# Patient Record
Sex: FEMALE | Employment: PART TIME | URBAN - METROPOLITAN AREA
[De-identification: names, ages, dates, MRNs, and addresses within clinical notes are randomized per-mention and may not be internally consistent; named-entity substitution may affect disease eponyms.]

---

## 2024-10-15 ENCOUNTER — TELEPHONE (OUTPATIENT)
Age: 24
End: 2024-10-15

## 2024-10-15 ENCOUNTER — OFFICE VISIT (OUTPATIENT)
Dept: FAMILY MEDICINE CLINIC | Facility: CLINIC | Age: 24
End: 2024-10-15
Payer: COMMERCIAL

## 2024-10-15 VITALS
TEMPERATURE: 98.1 F | RESPIRATION RATE: 16 BRPM | SYSTOLIC BLOOD PRESSURE: 110 MMHG | DIASTOLIC BLOOD PRESSURE: 78 MMHG | HEART RATE: 78 BPM

## 2024-10-15 DIAGNOSIS — R30.0 DYSURIA: ICD-10-CM

## 2024-10-15 DIAGNOSIS — M54.50 LUMBAR BACK PAIN: Primary | ICD-10-CM

## 2024-10-15 DIAGNOSIS — Z76.89 ENCOUNTER TO ESTABLISH CARE: ICD-10-CM

## 2024-10-15 LAB
SL AMB  POCT GLUCOSE, UA: NORMAL
SL AMB LEUKOCYTE ESTERASE,UA: NORMAL
SL AMB POCT BILIRUBIN,UA: NORMAL
SL AMB POCT BLOOD,UA: NORMAL
SL AMB POCT CLARITY,UA: NORMAL
SL AMB POCT COLOR,UA: CLEAR
SL AMB POCT KETONES,UA: NORMAL
SL AMB POCT NITRITE,UA: NORMAL
SL AMB POCT PH,UA: 6
SL AMB POCT SPECIFIC GRAVITY,UA: 1.03
SL AMB POCT URINE PROTEIN: NORMAL
SL AMB POCT UROBILINOGEN: NORMAL

## 2024-10-15 PROCEDURE — 99203 OFFICE O/P NEW LOW 30 MIN: CPT

## 2024-10-15 PROCEDURE — 81002 URINALYSIS NONAUTO W/O SCOPE: CPT

## 2024-10-15 RX ORDER — MELOXICAM 15 MG/1
15 TABLET ORAL DAILY
Qty: 14 TABLET | Refills: 0 | Status: SHIPPED | OUTPATIENT
Start: 2024-10-15

## 2024-10-15 RX ORDER — SENNOSIDES 8.6 MG
650 CAPSULE ORAL EVERY 8 HOURS PRN
Qty: 30 TABLET | Refills: 0 | Status: SHIPPED | OUTPATIENT
Start: 2024-10-15

## 2024-10-15 NOTE — PROGRESS NOTES
Ambulatory Visit  Name: Amira Lee      : 2000      MRN: [id number]  Encounter Provider: Buck Clark DO  Encounter Date: 10/15/2024   Encounter department: [facility]    Assessment & Plan  Lumbar back pain  Patient present with new onset low back pain without acute injury.  Negative straight leg raise bilaterally.  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  Patient's vital signs within normal limits.  Patient did also complain of suprapubic tenderness and dysuria but no signs of acute UTI with normal point-of-care urinalysis.    5 out of 5 muscle strength bilateral lower extremities.  Complete sensation intact bilateral lower extremities.  No bowel or bladder incontinence or saddle anesthesias. (Patient encouraged to go to ed if she develops any of these symptoms)    We will trial OMT therapy scheduled for this week (prior auth required), Mobic once daily for 2 weeks, Tylenol as needed for breakthrough pain as well as Voltaren gel.  Patient also urged to stay active without heavy lifting and to use ice and heat on a daily basis.    We will follow-up for annual physical within 1 month for healthcare maintenance.  Patient does not complain of abnormal uterine bleeding or menorrhagia or sexual activity.  Although patient does endorse painful periods.  We can investigate further and complete cervical cancer screening at annual physical especially if back pain does not resolved with conservative management.  Orders:    meloxicam (Mobic) 15 mg tablet; Take 1 tablet (15 mg total) by mouth daily    Diclofenac Sodium (VOLTAREN) 1 %; Apply 2 g topically 4 (four) times a day    acetaminophen (TYLENOL) 650 mg CR tablet; Take 1 tablet (650 mg total) by mouth every 8 (eight) hours as needed for mild pain    POCT urine dip    Encounter to establish care         Dysuria  Mild dysuria and suprapubic tenderness. With  normal UA & no CV tenderness nor fevers. Will treat back pain and continue to monitor for symptoms. Patient also seemed to have difficulty with  on cyra com with describing symptoms.   Orders:    POCT urine dip       History of Present Illness     Patient presents to the office to establish care.  She does not endorse any chronic medical conditions or does not take any medication regularly.  She decided establish care due to new onset back pain which occurred approximately 10 days ago.  Patient cannot pinpoint 1 specific injury or event in which this back pain began.  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  She has to bend twist and lift up many boxes that are very heavy.  Patient denies any history of back injuries in the past.  Patient states she has never had pain this severe in the past.  Although she has had aches and pains that self resolved.  Patient denies any bowel or bladder incontinence or weakness.  Although she does endorse suprapubic tenderness and dysuria.    She endorses regular menstrual cycles with no recent sexual activity within the last year.  Her most recent period was on 10/7/2024.    Back pain characteristics as listed below.    Back Pain  This is a new problem. The current episode started 1 to 4 weeks ago. The problem occurs constantly. The problem is unchanged. The pain is present in the lumbar spine. The quality of the pain is described as aching, burning and stabbing. The pain does not radiate. The pain is at a severity of 6/10. The symptoms are aggravated by twisting and standing (extension). Stiffness is present All day. Associated symptoms include abdominal pain, dysuria, leg pain and tingling. Pertinent negatives include no bladder incontinence, bowel incontinence, fever, headaches, numbness, paresis, paresthesias, pelvic pain, perianal numbness, weakness or weight loss. Risk factors include poor posture. She has tried  nothing for the symptoms.       History obtained from : patient w/ cyra com interpretation   Review of Systems   Constitutional:  Negative for fever and weight loss.   Gastrointestinal:  Positive for abdominal pain. Negative for bowel incontinence.   Genitourinary:  Positive for dysuria. Negative for bladder incontinence and pelvic pain.   Musculoskeletal:  Positive for back pain.   Neurological:  Positive for tingling. Negative for weakness, numbness, headaches and paresthesias.     Pertinent Medical History         Medical History Reviewed by provider this encounter:  Meds  Problems  Med Hx  Surg Hx  Fam Hx       Past Medical History   History reviewed. No pertinent past medical history.  History reviewed. No pertinent surgical history.  History reviewed. No pertinent family history.  No current outpatient medications on file prior to visit.     No current facility-administered medications on file prior to visit.   Not on File   No current outpatient medications on file prior to visit.     No current facility-administered medications on file prior to visit.      Social History     Tobacco Use    Smoking status: Not on file    Smokeless tobacco: Not on file   Substance and Sexual Activity    Alcohol use: Not on file    Drug use: Not on file    Sexual activity: Not on file         Objective     /78 (BP Location: Left arm)   Pulse 78   Temp 98.1 °F (36.7 °C) (Temporal)   Resp 16   LMP 10/07/2024 (Approximate)     Physical Exam  Constitutional:       General: She is not in acute distress.     Appearance: She is not ill-appearing or toxic-appearing.   HENT:      Head: Normocephalic and atraumatic.      Right Ear: External ear normal.      Left Ear: External ear normal.      Nose: No congestion or rhinorrhea.      Mouth/Throat:      Pharynx: No oropharyngeal exudate or posterior oropharyngeal erythema.   Eyes:      Conjunctiva/sclera: Conjunctivae normal.   Cardiovascular:      Rate and Rhythm: Normal  rate and regular rhythm.      Pulses: Normal pulses.      Heart sounds: Normal heart sounds. No murmur heard.  Pulmonary:      Effort: Pulmonary effort is normal. No respiratory distress.      Breath sounds: Normal breath sounds. No wheezing or rales.   Abdominal:      General: Bowel sounds are normal. There is no distension.      Palpations: Abdomen is soft.      Tenderness: There is no abdominal tenderness (mild suprapubic tenderness). There is no right CVA tenderness, left CVA tenderness or guarding.   Musculoskeletal:         General: Tenderness (lumbar paraspinal musculature & PSIS on right) present. No swelling. Normal range of motion.      Cervical back: Normal.      Thoracic back: Normal.      Lumbar back: Spasms and tenderness present. No swelling, edema, deformity, signs of trauma, lacerations or bony tenderness. Normal range of motion. Negative right straight leg raise test and negative left straight leg raise test. No scoliosis.      Right lower leg: No edema.      Left lower leg: No edema.      Comments: L1 to L5 NsLr R     No step offs noted     5/5 muscle strength bilateral lower extremities  Plus 2 out of 4 reflexes of patellar and Achilles bilaterally  Sensation intact bilateral lower extremities   Skin:     General: Skin is warm.      Capillary Refill: Capillary refill takes less than 2 seconds.   Neurological:      General: No focal deficit present.      Mental Status: She is alert and oriented to person, place, and time. Mental status is at baseline.   Psychiatric:         Mood and Affect: Mood normal.         Behavior: Behavior normal.       Buck Clark, DO  PGY-2 Family Medicine

## 2024-10-15 NOTE — TELEPHONE ENCOUNTER
PA for Diclofenac Sodium (VOLTAREN) 1 % SUBMITTED     via    []CMM-KEY:    [x]Surescripts-Case ID # [id number]  []Availity-Auth ID #  NDC #     []Faxed to plan   []Other website    []Phone call Case ID #      Office notes sent, clinical questions answered. Awaiting determination    Turnaround time for your insurance to make a decision on your Prior Authorization can take 7-21 business days.

## 2024-10-17 ENCOUNTER — PROCEDURE VISIT (OUTPATIENT)
Dept: FAMILY MEDICINE CLINIC | Facility: CLINIC | Age: 24
End: 2024-10-17
Payer: COMMERCIAL

## 2024-10-17 DIAGNOSIS — M99.02 SOMATIC DYSFUNCTION OF THORACIC REGION: ICD-10-CM

## 2024-10-17 DIAGNOSIS — M99.04 SOMATIC DYSFUNCTION OF SACRAL REGION: ICD-10-CM

## 2024-10-17 DIAGNOSIS — M54.50 ACUTE RIGHT-SIDED LOW BACK PAIN WITHOUT SCIATICA: Primary | ICD-10-CM

## 2024-10-17 DIAGNOSIS — M99.03 SOMATIC DYSFUNCTION OF LUMBAR REGION: ICD-10-CM

## 2024-10-17 DIAGNOSIS — M99.05 SOMATIC DYSFUNCTION OF PELVIS REGION: ICD-10-CM

## 2024-10-17 PROCEDURE — 99213 OFFICE O/P EST LOW 20 MIN: CPT

## 2024-10-17 NOTE — TELEPHONE ENCOUNTER
PA for Diclofenac Sodium (VOLTAREN) 1 % DENIED    Reason:(Screenshot if applicable)        Message sent to office clinical pool Yes    Denial letter scanned into Media Yes    Appeal started No (Provider will need to decide if appeal is warranted and send clinical documentation to Prior Authorization Team for initiation.)    **Please follow up with your patient regarding denial and next steps**

## 2024-10-18 NOTE — PROGRESS NOTES
The Assessment & Plan     This is a 24 y.o. female who presents for OMT follow-up for:  1. Acute right-sided low back pain without sciatica        2. Somatic dysfunction of thoracic region        3. Somatic dysfunction of lumbar region        4. Somatic dysfunction of sacral region        5. Somatic dysfunction of pelvis region             1. Patient tolerated OMT well for the above problems,  advised patient to drink fluids and can use NSAID for soreness after treatment     2. OMT Follow up in 2 weeks.    Subjective     CyraCom Hebrew interruption used:     Amira Lee is a 24 y.o. female and is here for a OMT follow up. The patient reports new onset back pain which occurred approximately 12 days ago. Patient cannot pinpoint 1 specific injury or event in which this back pain began. 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 Patient denies any history of back injuries in the past. Patient states she has never had pain this severe in the past. Although she has had aches and pains that self resolved. Patient denies any bowel or bladder incontinence or weakness.     Patient has noted improvement with Mobic daily.  Although she was not able to get Tylenol and Voltaren gel.  Due to insurance approval.  Patient was educated that these are over-the-counter medications and most likely insurance will not approve.  Patient showed for pictures of these medications so she can  at her local pharmacy.  Patient expresses understanding.    Is the patient taking Pain medication? no  Has the patient completed physical therapy for this condition? no  Did Patient symptoms improve from last OMT appointment?  This is her first appointment.     The following portions of the patient's history were reviewed and updated as appropriate: allergies, current medications, past family history, past medical history, past  social history, past surgical history, and problem list.    Review of Systems  Review of Systems   Musculoskeletal:  Positive for back pain. Negative for gait problem.   Neurological:  Negative for weakness and headaches.         Objective     OMT Exam     OMT    Performed by: Buck Clark DO  Authorized by: Buck Clark DO  Universal Protocol:  procedure performed by consultantConsent: Verbal consent obtained.  Consent given by: patient  Patient understanding: patient states understanding of the procedure being performed  Patient identity confirmed: verbally with patient      Procedure Details:     Region evaluated and treated:  Pelvis Innominate, Lumbar, Sacrum/Pelvis and Thoracic    Thoracic Information  Thoracic Region: T10 - T12  Thoracic T10 - T12 details:     Examination Method:  Tissue Texture Change, Stability, Laxity, Effusions, Tone and Asymmetry, Misalignment, Crepitation, Defects, Masses    Severity:  Moderate    Osteopathic Findings:  Quadratus lumborum spasm on patient's right.  Paraspinal hypertonicity.  T10-T12 neutral side bent left rotated right    Treatment Method:  Soft Tissue Treatment, Myofascial Release Treatment, Muscle Energy Treatment, High Velocity, Low Amplitude Treatment, Facilitated Positional Release Treatment and Counterstrain Treatment    Response:  Improved - The somatic dysfunction is improved but not completely resolved.    Lumbar details:     Examination Method:  Asymmetry, Misalignment, Crepitation, Defects, Masses and Tissue Texture Change, Stability, Laxity, Effusions, Tone    Severity:  Moderate    Osteopathic Findings:  Paraspinal hypertonicity very prominent on patient's right.  L1 L5 neutral side bent left rotated right.  L3 flexed side bent right rotated right.    *Patient had pressure and discomfort due to muscle tension and stretching of muscle energy so treatment was discontinued at patient's request*     Treatment Method:  Soft Tissue Treatment, Myofascial  Release Treatment, Muscle Energy Treatment and Facilitated Positional Release Treatment    Response:  Improved - The somatic dysfunction is improved but not completely resolved.    Sacrum/Pelvis details:     Examination Method:  Tissue Texture Change, Stability, Laxity, Effusions, Tone and Asymmetry, Misalignment, Crepitation, Defects, Masses    Severity:  Moderate    Osteopathic Findings:  Positive back pending.  Sacral stuck in extension.    Treatment Method:  Articulatory Treatment    Response:  Improved - The somatic dysfunction is improved but not completely resolved.    Pelvis Innominate details:     Examination Method:  Tissue Texture Change, Stability, Laxity, Effusions, Tone and Asymmetry, Misalignment, Crepitation, Defects, Masses    Severity:  Moderate    Osteopathic Findings:  Anterior innominate rotation    Treatment Method:  Muscle Energy Treatment and Facilitated Positional Release Treatment    Response:  Improved - The somatic dysfunction is improved but not completely resolved.    Total Regions Treated:  4  Attending provider present in exam room for procedure: Effie Clark, DO  PGY-2 Family Medicine

## 2024-11-22 ENCOUNTER — TELEPHONE (OUTPATIENT)
Age: 24
End: 2024-11-22

## 2024-11-22 ENCOUNTER — PROCEDURE VISIT (OUTPATIENT)
Dept: FAMILY MEDICINE CLINIC | Facility: CLINIC | Age: 24
End: 2024-11-22
Payer: COMMERCIAL

## 2024-11-22 DIAGNOSIS — Z11.59 ENCOUNTER FOR HEPATITIS C SCREENING TEST FOR LOW RISK PATIENT: ICD-10-CM

## 2024-11-22 DIAGNOSIS — F41.1 GENERALIZED ANXIETY DISORDER: ICD-10-CM

## 2024-11-22 DIAGNOSIS — Z28.21 IMMUNIZATION DECLINED: ICD-10-CM

## 2024-11-22 DIAGNOSIS — Z00.00 ANNUAL PHYSICAL EXAM: Primary | ICD-10-CM

## 2024-11-22 DIAGNOSIS — Z13.1 SCREENING FOR DIABETES MELLITUS: ICD-10-CM

## 2024-11-22 DIAGNOSIS — Z13.220 SCREENING, LIPID: ICD-10-CM

## 2024-11-22 DIAGNOSIS — R53.83 OTHER FATIGUE: ICD-10-CM

## 2024-11-22 DIAGNOSIS — Z11.4 ENCOUNTER FOR SCREENING FOR HIV: ICD-10-CM

## 2024-11-22 DIAGNOSIS — M54.50 ACUTE LOW BACK PAIN WITHOUT SCIATICA, UNSPECIFIED BACK PAIN LATERALITY: ICD-10-CM

## 2024-11-22 PROCEDURE — 99213 OFFICE O/P EST LOW 20 MIN: CPT

## 2024-11-22 PROCEDURE — 99395 PREV VISIT EST AGE 18-39: CPT

## 2024-11-22 RX ORDER — SERTRALINE HYDROCHLORIDE 25 MG/1
25 TABLET, FILM COATED ORAL DAILY
Qty: 30 TABLET | Refills: 5 | Status: SHIPPED | OUTPATIENT
Start: 2024-11-22 | End: 2025-05-21

## 2024-11-22 NOTE — TELEPHONE ENCOUNTER
Contacted patient in regards to Routine Referral in attempts to verify patient's needs of services and add patient to proper wait list. spoke with patient whom stated she is interested in talk therapy and when telling patient she can be added to the wait list, patient proceeded to say she was at work and will call back. Closing referral.

## 2024-11-22 NOTE — PATIENT INSTRUCTIONS
"Patient Education     Routine physical for adults   The Basics   Written by the doctors and editors at [facility]   What is a physical? -- A physical is a routine visit, or \"check-up,\" with your doctor. You might also hear it called a \"wellness visit\" or \"preventive visit.\"  During each visit, the doctor will:   Ask about your physical and mental health   Ask about your habits, behaviors, and lifestyle   Do an exam   Give you vaccines if needed   Talk to you about any medicines you take   Give advice about your health   Answer your questions  Getting regular check-ups is an important part of taking care of your health. It can help your doctor find and treat any problems you have. But it's also important for preventing health problems.  A routine physical is different from a \"sick visit.\" A sick visit is when you see a doctor because of a health concern or problem. Since physicals are scheduled ahead of time, you can think about what you want to ask the doctor.  How often should I get a physical? -- It depends on your age and health. In general, for people age 21 years and older:   If you are younger than 50 years, you might be able to get a physical every 3 years.   If you are 50 years or older, your doctor might recommend a physical every year.  If you have an ongoing health condition, like diabetes or high blood pressure, your doctor will probably want to see you more often.  What happens during a physical? -- In general, each visit will include:   Physical exam - The doctor or nurse will check your height, weight, heart rate, and blood pressure. They will also look at your eyes and ears. They will ask about how you are feeling and whether you have any symptoms that bother you.   Medicines - It's a good idea to bring a list of all the medicines you take to each doctor visit. Your doctor will talk to you about your medicines and answer any questions. Tell them if you are having any side effects that bother you. You " "should also tell them if you are having trouble paying for any of your medicines.   Habits and behaviors - This includes:   Your diet   Your exercise habits   Whether you smoke, drink alcohol, or use drugs   Whether you are sexually active   Whether you feel safe at home  Your doctor will talk to you about things you can do to improve your health and lower your risk of health problems. They will also offer help and support. For example, if you want to quit smoking, they can give you advice and might prescribe medicines. If you want to improve your diet or get more physical activity, they can help you with this, too.   Lab tests, if needed - The tests you get will depend on your age and situation. For example, your doctor might want to check your:   Cholesterol   Blood sugar   Iron level   Vaccines - The recommended vaccines will depend on your age, health, and what vaccines you already had. Vaccines are very important because they can prevent certain serious or deadly infections.   Discussion of screening - \"Screening\" means checking for diseases or other health problems before they cause symptoms. Your doctor can recommend screening based on your age, risk, and preferences. This might include tests to check for:   Cancer, such as breast, prostate, cervical, ovarian, colorectal, prostate, lung, or skin cancer   Sexually transmitted infections, such as chlamydia and gonorrhea   Mental health conditions like depression and anxiety  Your doctor will talk to you about the different types of screening tests. They can help you decide which screenings to have. They can also explain what the results might mean.   Answering questions - The physical is a good time to ask the doctor or nurse questions about your health. If needed, they can refer you to other doctors or specialists, too.  Adults older than 65 years often need other care, too. As you get older, your doctor will talk to you about:   How to prevent falling at " home   Hearing or vision tests   Memory testing   How to take your medicines safely   Making sure that you have the help and support you need at home  All topics are updated as new evidence becomes available and our peer review process is complete.  This topic retrieved from [place] on: May 02, 2024.  Topic 712172 Version 1.0  Release: 32.4.3 - C32.122  © 2024 UpToDate, Inc. and/or its affiliates. All rights reserved.  Consumer Information Use and Disclaimer   Disclaimer: This generalized information is a limited summary of diagnosis, treatment, and/or medication information. It is not meant to be comprehensive and should be used as a tool to help the user understand and/or assess potential diagnostic and treatment options. It does NOT include all information about conditions, treatments, medications, side effects, or risks that may apply to a specific patient. It is not intended to be medical advice or a substitute for the medical advice, diagnosis, or treatment of a health care provider based on the health care provider's examination and assessment of a patient's specific and unique circumstances. Patients must speak with a health care provider for complete information about their health, medical questions, and treatment options, including any risks or benefits regarding use of medications. This information does not endorse any treatments or medications as safe, effective, or approved for treating a specific patient. UpToDate, Inc. and its affiliates disclaim any warranty or liability relating to this information or the use thereof.The use of this information is governed by the Terms of Use, available at https://www.woltersZingkuuwer.com/en/know/clinical-effectiveness-terms. 2024© UpToDate, Inc. and its affiliates and/or licensors. All rights reserved.  Copyright   © 2024 UpToDate, Inc. and/or its affiliates. All rights reserved.

## 2024-11-22 NOTE — PROGRESS NOTES
Adult Annual Physical  Name: Amira Lee      : 2000      MRN: [id number]  Encounter Provider: Buck Clark DO  Encounter Date: 2024   Encounter department: [facility]    Assessment & Plan  Annual physical exam  -patient endorses normal pap last year. Will bring in records.        Generalized anxiety disorder  PAULO 7 score of 10. Effecting daily life. Patient provided with coping skills. Patient will trial ssri + therapy as noted below. Fu in 1 month with suspected up-titration.   Orders:    Ambulatory referral to Psych Services; Future    sertraline (ZOLOFT) 25 mg tablet; Take 1 tablet (25 mg total) by mouth daily    Acute low back pain without sciatica, unspecified back pain laterality  Resolved after prior OMT appointment and medication intervention        Screening for diabetes mellitus    Orders:    Comprehensive metabolic panel; Future    Screening, lipid    Orders:    Lipid panel; Future    Other fatigue  Likely due to severe PAULO anxiety. will check thyroid as below to rule out organic cause.   Orders:    TSH, 3rd generation with Free T4 reflex; Future    Encounter for screening for HIV    Orders:    HIV 1/2 AG/AB w Reflex [facility] for 2 yr old and above; Future    Encounter for hepatitis C screening test for low risk patient    Orders:    Hepatitis C antibody; Future    Immunization declined  Declined flu shot and additional vaccines. Asked her to bring in prior records        Immunizations and preventive care screenings were discussed with patient today. Appropriate education was printed on patient's after visit summary.    Counseling:  Alcohol/drug use: discussed moderation in alcohol intake, the recommendations for healthy alcohol use, and avoidance of illicit drug use.  Dental Health: discussed importance of regular tooth brushing, flossing, and dental visits.  Injury prevention: discussed safety/seat belts, safety helmets, smoke detectors, carbon  monoxide detectors, and smoking near bedding or upholstery.  Sexual health: discussed sexually transmitted diseases, partner selection, use of condoms, avoidance of unintended pregnancy, and contraceptive alternatives.  Exercise: the importance of regular exercise/physical activity was discussed. Recommend exercise 3-5 times per week for at least 30 minutes.       Depression Screening and Follow-up Plan: Patient was screened for depression during today's encounter. They screened negative with a PHQ-2 score of 2.    Tobacco Cessation Counseling: Patient does not use         History of Present Illness     Adult Annual Physical:  Patient presents for annual physical. Patient presents to office for physical. Patient reports back pain resolved from prior appointments.     Patient also reports Stress at home with family health and job issues and moving to the [place]. She endorses that she feels anxious most days. She states this is impacting sleep and impacting appetitive. She reports not being hungry often.  She has had anxiety in the past but never this severe. She also reports 1 to 2 episodes of feeling very anxious with palpitations and feeling like she is zoning out or floating over her body. She denies real depression. Endorses desire to live. Denies any si/Hi. She has never been on any medication for this. .     Diet and Physical Activity:  - Diet/Nutrition: well balanced diet. sometimes skips dinner dt working late; consumes fruits, vegetables occasionaly but doesn't like vegetables; doesn't eat out much  - Exercise: 1-2 times a week on average and moderate cardiovascular exercise. used to exercise more in [facility], is having a harder time finding a place/time to exercise in the [place]. Uses YouTube videos    Depression Screening:  - PHQ-2 Score: 2    General Health:  - Sleep: sleeps well and 4-6 hours of sleep on average. Used to use a sleep aid (furton?)  - Hearing: normal hearing right ear and normal hearing left ear.  -  Vision: no vision problems, wears glasses and most recent eye exam > 1 year ago. wears glasses for driving; no night vision loss  - Dental: regular dental visits.    /GYN Health:  - Follows with GYN: no.   - Menopause: premenopausal.   - Last menstrual cycle: 11/4/2024.   - History of STDs: no  - Contraception: barrier methods. Not sexually active for past 2 years      Advanced Care Planning:  - Has an advanced directive?: no    - Has a durable medical POA?: no    - ACP document given to patient?: no      Review of Systems   Constitutional:  Positive for appetite change and fatigue. Negative for chills and fever.   Respiratory:  Negative for chest tightness, shortness of breath and wheezing.    Cardiovascular:  Negative for chest pain.   Gastrointestinal:  Negative for abdominal pain, blood in stool, diarrhea and nausea.   Genitourinary:  Negative for difficulty urinating and dysuria.   Musculoskeletal:  Negative for back pain.   Neurological:  Negative for dizziness, weakness, light-headedness and headaches.   Psychiatric/Behavioral:  Positive for decreased concentration and sleep disturbance. Negative for agitation, behavioral problems, confusion, self-injury and suicidal ideas. The patient is nervous/anxious.        Medical History Reviewed by provider this encounter:     .  Past Medical History   No past medical history on file.  No past surgical history on file.  No family history on file.   reports that she has never smoked. She has never used smokeless tobacco.  Current Outpatient Medications on File Prior to Visit   Medication Sig Dispense Refill    acetaminophen (TYLENOL) 650 mg CR tablet Take 1 tablet (650 mg total) by mouth every 8 (eight) hours as needed for mild pain 30 tablet 0    Diclofenac Sodium (VOLTAREN) 1 % Apply 2 g topically 4 (four) times a day 50 g 1    [DISCONTINUED] meloxicam (Mobic) 15 mg tablet Take 1 tablet (15 mg total) by mouth daily 14 tablet 0     No current facility-administered  medications on file prior to visit.   Not on File   Current Outpatient Medications on File Prior to Visit   Medication Sig Dispense Refill    acetaminophen (TYLENOL) 650 mg CR tablet Take 1 tablet (650 mg total) by mouth every 8 (eight) hours as needed for mild pain 30 tablet 0    Diclofenac Sodium (VOLTAREN) 1 % Apply 2 g topically 4 (four) times a day 50 g 1    [DISCONTINUED] meloxicam (Mobic) 15 mg tablet Take 1 tablet (15 mg total) by mouth daily 14 tablet 0     No current facility-administered medications on file prior to visit.      Social History     Tobacco Use    Smoking status: Never    Smokeless tobacco: Never   Substance and Sexual Activity    Alcohol use: Not on file    Drug use: Not on file    Sexual activity: Not on file       Objective   There were no vitals taken for this visit.    Physical Exam  Constitutional:       General: She is not in acute distress.     Appearance: She is not ill-appearing or toxic-appearing.   HENT:      Head: Normocephalic and atraumatic.      Right Ear: External ear normal.      Left Ear: External ear normal.      Nose: No congestion or rhinorrhea.      Mouth/Throat:      Pharynx: No oropharyngeal exudate or posterior oropharyngeal erythema.   Eyes:      Conjunctiva/sclera: Conjunctivae normal.   Cardiovascular:      Rate and Rhythm: Normal rate and regular rhythm.      Pulses: Normal pulses.      Heart sounds: Normal heart sounds. No murmur heard.  Pulmonary:      Effort: Pulmonary effort is normal. No respiratory distress.      Breath sounds: Normal breath sounds. No wheezing or rales.   Abdominal:      General: Bowel sounds are normal. There is no distension.      Palpations: Abdomen is soft.      Tenderness: There is no abdominal tenderness. There is no guarding.   Musculoskeletal:      Right lower leg: No edema.      Left lower leg: No edema.   Skin:     General: Skin is warm.      Capillary Refill: Capillary refill takes less than 2 seconds.   Neurological:       General: No focal deficit present.      Mental Status: She is alert and oriented to person, place, and time. Mental status is at baseline.   Psychiatric:         Attention and Perception: Attention and perception normal.         Mood and Affect: Mood is anxious.         Speech: Speech normal.         Behavior: Behavior normal. Behavior is not agitated, aggressive or hyperactive.         Thought Content: Thought content normal. Thought content is not paranoid or delusional. Thought content does not include homicidal or suicidal ideation. Thought content does not include homicidal or suicidal plan.         Cognition and Memory: Cognition normal.         Judgment: Judgment normal.       Buck Clark, DO  PGY-2 Family Medicine

## 2024-12-26 ENCOUNTER — APPOINTMENT (OUTPATIENT)
Dept: LAB | Facility: CLINIC | Age: 24
End: 2024-12-26
Payer: COMMERCIAL

## 2024-12-26 DIAGNOSIS — Z13.1 SCREENING FOR DIABETES MELLITUS: ICD-10-CM

## 2024-12-26 DIAGNOSIS — Z11.59 ENCOUNTER FOR HEPATITIS C SCREENING TEST FOR LOW RISK PATIENT: ICD-10-CM

## 2024-12-26 DIAGNOSIS — Z11.4 ENCOUNTER FOR SCREENING FOR HIV: ICD-10-CM

## 2024-12-26 DIAGNOSIS — R53.83 OTHER FATIGUE: ICD-10-CM

## 2024-12-26 DIAGNOSIS — Z13.220 SCREENING, LIPID: ICD-10-CM

## 2024-12-26 LAB
ALBUMIN SERPL BCG-MCNC: 4.5 G/DL (ref 3.5–5)
ALP SERPL-CCNC: 39 U/L (ref 34–104)
ALT SERPL W P-5'-P-CCNC: 9 U/L (ref 7–52)
ANION GAP SERPL CALCULATED.3IONS-SCNC: 6 MMOL/L (ref 4–13)
AST SERPL W P-5'-P-CCNC: 16 U/L (ref 13–39)
BILIRUB SERPL-MCNC: 0.3 MG/DL (ref 0.2–1)
BUN SERPL-MCNC: 6 MG/DL (ref 5–25)
CALCIUM SERPL-MCNC: 9.9 MG/DL (ref 8.4–10.2)
CHLORIDE SERPL-SCNC: 102 MMOL/L (ref 96–108)
CO2 SERPL-SCNC: 30 MMOL/L (ref 21–32)
CREAT SERPL-MCNC: 0.61 MG/DL (ref 0.6–1.3)
GFR SERPL CREATININE-BSD FRML MDRD: 127 ML/MIN/1.73SQ M
GLUCOSE SERPL-MCNC: 67 MG/DL (ref 65–140)
HCV AB SER QL: NORMAL
HIV 1+2 AB+HIV1 P24 AG SERPL QL IA: NORMAL
HIV 2 AB SERPL QL IA: NORMAL
HIV1 AB SERPL QL IA: NORMAL
HIV1 P24 AG SERPL QL IA: NORMAL
POTASSIUM SERPL-SCNC: 3.7 MMOL/L (ref 3.5–5.3)
PROT SERPL-MCNC: 7 G/DL (ref 6.4–8.4)
SODIUM SERPL-SCNC: 138 MMOL/L (ref 135–147)
TSH SERPL DL<=0.05 MIU/L-ACNC: 1.3 UIU/ML (ref 0.45–4.5)

## 2024-12-26 PROCEDURE — 80053 COMPREHEN METABOLIC PANEL: CPT

## 2024-12-26 PROCEDURE — 87389 HIV-1 AG W/HIV-1&-2 AB AG IA: CPT

## 2024-12-26 PROCEDURE — 86803 HEPATITIS C AB TEST: CPT

## 2024-12-26 PROCEDURE — 84443 ASSAY THYROID STIM HORMONE: CPT

## 2024-12-26 PROCEDURE — 36415 COLL VENOUS BLD VENIPUNCTURE: CPT

## 2024-12-27 ENCOUNTER — APPOINTMENT (OUTPATIENT)
Dept: LAB | Facility: CLINIC | Age: 24
End: 2024-12-27
Payer: COMMERCIAL

## 2024-12-27 LAB
CHOLEST SERPL-MCNC: 122 MG/DL (ref ?–200)
HDLC SERPL-MCNC: 50 MG/DL
LDLC SERPL CALC-MCNC: 56 MG/DL (ref 0–100)
NONHDLC SERPL-MCNC: 72 MG/DL
TRIGL SERPL-MCNC: 81 MG/DL (ref ?–150)

## 2024-12-27 PROCEDURE — 80061 LIPID PANEL: CPT

## 2024-12-27 PROCEDURE — 36415 COLL VENOUS BLD VENIPUNCTURE: CPT

## 2025-01-03 ENCOUNTER — OFFICE VISIT (OUTPATIENT)
Dept: FAMILY MEDICINE CLINIC | Facility: CLINIC | Age: 25
End: 2025-01-03

## 2025-01-03 VITALS
OXYGEN SATURATION: 100 % | TEMPERATURE: 98 F | SYSTOLIC BLOOD PRESSURE: 112 MMHG | HEIGHT: 63 IN | DIASTOLIC BLOOD PRESSURE: 69 MMHG | WEIGHT: 119 LBS | HEART RATE: 81 BPM | BODY MASS INDEX: 21.09 KG/M2

## 2025-01-03 DIAGNOSIS — F41.1 GAD (GENERALIZED ANXIETY DISORDER): Primary | ICD-10-CM

## 2025-01-03 NOTE — ASSESSMENT & PLAN NOTE
Patient notes improvement of symptoms last visit with social interaction despite no medication therapy  Patient wants to stop taking ssri due to fatigue although patient urged effect of medication can take weeks to occur.  Will trial again off medication  Provided coping skills  If patient willing next appointment and would like to try medical therapy can consider buspar versus different ssri. Preferably more activating one such as prozac due to patients complaint of fatigue.     Can also redo PAULO 7 at follow up for patient discussion and dictation of managment

## 2025-01-03 NOTE — PROGRESS NOTES
Name: Amira Lee      : 2000      MRN: [id number]  Encounter Provider: Buck Clark DO  Encounter Date: 1/3/2025   Encounter department: [facility]  :  Assessment & Plan  PAULO (generalized anxiety disorder)  Patient notes improvement of symptoms last visit with social interaction despite no medication therapy  Patient wants to stop taking ssri due to fatigue although patient urged effect of medication can take weeks to occur.  Will trial again off medication  Provided coping skills  If patient willing next appointment and would like to try medical therapy can consider buspar versus different ssri. Preferably more activating one such as prozac due to patients complaint of fatigue.     Can also redo PAULO 7 at follow up for patient discussion and dictation of managment             Depression Screening and Follow-up Plan:   Patient's depression screening was negative with an [place]  Depression Scale score of  .     History of Present Illness     Patient presents to office for follow up of PAULO.    She states she has stopped taking medication due to it making her sleepy. (Only took 3 doses). She tried taking it at night which casused AM drowseyness  She notes anxiety is much improved after family time at the holidays.  Denies any HI/SI  Has good mood stability  Would like to hold off trailing anxiety medication at this time.  Patient reports schedule is tight and requesting to end appointment early due to needing to get to work in 20 min  Patient is okay with 1 month follow up on anxiety without medication.  Patient will monitor for symptoms and reach out if symptoms recur      Anxiety  Presents for follow-up visit. Symptoms include excessive worry, muscle tension and nervous/anxious behavior. Patient reports no chest pain, confusion, decreased concentration, depressed mood, dizziness, hyperventilation, impotence, insomnia, irritability, nausea, obsessions,  "palpitations, panic, restlessness, shortness of breath or suicidal ideas. Symptoms occur occasionally. The severity of symptoms is moderate. The patient sleeps 7 hours per night. The quality of sleep is good. Nighttime awakenings: none.     Compliance with medications is 0-25% (patient has stopped taking medication).     Review of Systems   Constitutional:  Negative for chills, fatigue, fever and irritability.   Respiratory:  Negative for chest tightness, shortness of breath and wheezing.    Cardiovascular:  Negative for chest pain and palpitations.   Gastrointestinal:  Negative for abdominal pain, blood in stool, diarrhea and nausea.   Genitourinary:  Negative for difficulty urinating, dysuria and impotence.   Neurological:  Negative for dizziness, weakness and light-headedness.   Psychiatric/Behavioral:  Negative for agitation, behavioral problems, confusion, decreased concentration, dysphoric mood, hallucinations, self-injury, sleep disturbance and suicidal ideas. The patient is nervous/anxious. The patient does not have insomnia and is not hyperactive.        Objective   /69 (BP Location: Right arm, Patient Position: Sitting, Cuff Size: Standard)   Pulse 81   Temp 98 °F (36.7 °C) (Temporal)   Ht 5' 3\" (1.6 m)   Wt 54 kg (119 lb)   SpO2 100%   BMI 21.08 kg/m²      Physical Exam  Constitutional:       General: She is not in acute distress.     Appearance: She is not ill-appearing or toxic-appearing.   HENT:      Head: Normocephalic and atraumatic.      Right Ear: External ear normal.      Left Ear: External ear normal.      Nose: No congestion or rhinorrhea.      Mouth/Throat:      Pharynx: No oropharyngeal exudate or posterior oropharyngeal erythema.   Eyes:      Conjunctiva/sclera: Conjunctivae normal.   Cardiovascular:      Rate and Rhythm: Normal rate and regular rhythm.      Pulses: Normal pulses.      Heart sounds: Normal heart sounds. No murmur heard.  Pulmonary:      Effort: Pulmonary effort is " normal. No respiratory distress.      Breath sounds: Normal breath sounds. No wheezing or rales.   Abdominal:      General: Bowel sounds are normal. There is no distension.      Palpations: Abdomen is soft.      Tenderness: There is no abdominal tenderness. There is no guarding.   Musculoskeletal:      Right lower leg: No edema.      Left lower leg: No edema.   Skin:     General: Skin is warm.      Capillary Refill: Capillary refill takes less than 2 seconds.   Neurological:      General: No focal deficit present.      Mental Status: She is alert and oriented to person, place, and time. Mental status is at baseline.   Psychiatric:         Mood and Affect: Mood normal.         Behavior: Behavior normal.     Buck Clark, DO  PGY-2 Family Medicine

## 2025-07-18 ENCOUNTER — OFFICE VISIT (OUTPATIENT)
Dept: DENTISTRY | Facility: CLINIC | Age: 25
End: 2025-07-18

## 2025-07-18 VITALS — HEART RATE: 99 BPM | DIASTOLIC BLOOD PRESSURE: 70 MMHG | SYSTOLIC BLOOD PRESSURE: 105 MMHG

## 2025-07-18 DIAGNOSIS — K06.8 GUMS, BLEEDING: Primary | ICD-10-CM

## 2025-07-18 PROCEDURE — D0220 INTRAORAL - PERIAPICAL FIRST RADIOGRAPHIC IMAGE: HCPCS

## 2025-07-18 PROCEDURE — D0140 LIMITED ORAL EVALUATION - PROBLEM FOCUSED: HCPCS

## 2025-07-18 NOTE — PROGRESS NOTES
Limited Exam    Amira Lee 24 y.o. female presents with self to [facility] for Limited exam  PMH reviewed, no changes, ASA I. Significant medical history: reviewed. Significant allergies: reviewed. Significant medications: reviewed.    Chief complaint:  Bleeding gums in several localized regions and also the past history of blood clot formation after receiving restoration in a lower anterior tooth    Consent:  Discussed that limited exam focuses on problem area, and same day tx is not guaranteed.  Patient explained to if they wish to have anything else evaluated, they need to return to the practice at which they are a patient of record or schedule a comprehensive exam afterwards.  Patient understands and consent was given by self via verbal consent.    Subjective history:    Onset: 2 years ago for blood clot and gum bleeding 2 weeks ago.       Objective clinical findings:   Oral cancer screening: normal.   Extraoral exam: no remarkable findings.  Intraoral exam: no remarkable findings.     Radiographs: Single PA - 24.         Assessment:  The previous blood clot patient described was not present. According to PA xray, there were no periapical radiolucencies in the lower anterior region.     Plan:   We advised the patient to schedule her new patient exam including full mouth x ray series as soon as possible as she needs several fillings and periodontal treatment.     Referral(s): None needed.  Rx: None.  Comprehensive care disposition: Patient is a patient of record at an outside practice and was encouraged to return to that practice to continue their comprehensive care.    Patient dismissed ambulatory and alert.    NV: Comprehensive oral examination (new patient exam).    Attending: Dr. Ruffin was present in clinic.

## 2025-08-22 ENCOUNTER — OFFICE VISIT (OUTPATIENT)
Dept: DENTISTRY | Facility: CLINIC | Age: 25
End: 2025-08-22

## 2025-08-22 VITALS — SYSTOLIC BLOOD PRESSURE: 107 MMHG | HEART RATE: 80 BPM | DIASTOLIC BLOOD PRESSURE: 71 MMHG

## 2025-08-22 DIAGNOSIS — Z01.20 DENTAL EXAMINATION: Primary | ICD-10-CM

## 2025-08-22 PROCEDURE — D0210 INTRAORAL - COMPLETE SERIES OF RADIOGRAPHIC IMAGES: HCPCS

## 2025-08-22 PROCEDURE — D0150 COMPREHENSIVE ORAL EVALUATION - NEW OR ESTABLISHED PATIENT: HCPCS
